# Patient Record
Sex: FEMALE | Race: OTHER | NOT HISPANIC OR LATINO
[De-identification: names, ages, dates, MRNs, and addresses within clinical notes are randomized per-mention and may not be internally consistent; named-entity substitution may affect disease eponyms.]

---

## 2024-01-30 ENCOUNTER — APPOINTMENT (OUTPATIENT)
Dept: SURGERY | Facility: CLINIC | Age: 54
End: 2024-01-30
Payer: COMMERCIAL

## 2024-01-30 PROCEDURE — 99203 OFFICE O/P NEW LOW 30 MIN: CPT

## 2024-02-22 PROBLEM — Z00.00 ENCOUNTER FOR PREVENTIVE HEALTH EXAMINATION: Status: ACTIVE | Noted: 2024-02-22

## 2024-03-15 ENCOUNTER — OUTPATIENT (OUTPATIENT)
Dept: OUTPATIENT SERVICES | Facility: HOSPITAL | Age: 54
LOS: 1 days | End: 2024-03-15
Payer: COMMERCIAL

## 2024-03-15 VITALS
RESPIRATION RATE: 18 BRPM | OXYGEN SATURATION: 98 % | SYSTOLIC BLOOD PRESSURE: 145 MMHG | HEIGHT: 60 IN | TEMPERATURE: 99 F | HEART RATE: 83 BPM | WEIGHT: 153 LBS | DIASTOLIC BLOOD PRESSURE: 89 MMHG

## 2024-03-15 DIAGNOSIS — Z01.818 ENCOUNTER FOR OTHER PREPROCEDURAL EXAMINATION: ICD-10-CM

## 2024-03-15 DIAGNOSIS — K81.9 CHOLECYSTITIS, UNSPECIFIED: ICD-10-CM

## 2024-03-15 DIAGNOSIS — E03.9 HYPOTHYROIDISM, UNSPECIFIED: ICD-10-CM

## 2024-03-15 DIAGNOSIS — I10 ESSENTIAL (PRIMARY) HYPERTENSION: ICD-10-CM

## 2024-03-15 DIAGNOSIS — E78.5 HYPERLIPIDEMIA, UNSPECIFIED: ICD-10-CM

## 2024-03-15 DIAGNOSIS — Z98.890 OTHER SPECIFIED POSTPROCEDURAL STATES: Chronic | ICD-10-CM

## 2024-03-15 LAB — BLD GP AB SCN SERPL QL: SIGNIFICANT CHANGE UP

## 2024-03-15 NOTE — H&P PST ADULT - GASTROINTESTINAL
scheduled for Laparoscopic Cholecystomy/soft/nondistended/normal active bowel sounds/tender details…

## 2024-03-15 NOTE — H&P PST ADULT - HISTORY OF PRESENT ILLNESS
54 y.o. Female with PMHx  HDL, Hypothyroidism, HTN, now with c/o right Upper quadrant pain, on work up found to have Cholecystitis   now scheduled for Laparoscopic Cholecystomy with intra- operative Cholangiogram possible open with Dr. Reyes on 3/22/2024.

## 2024-03-15 NOTE — H&P PST ADULT - RESPIRATORY
clear to auscultation bilaterally/no wheezes/no rales/no respiratory distress/airway patent/breath sounds equal

## 2024-03-15 NOTE — H&P PST ADULT - NSICDXPROCEDURE_GEN_ALL_CORE_FT
PROCEDURES:  Cholecystectomy, laparoscopic, with possible cholangiogram 15-Mar-2024 12:06:34  Ashley Jean

## 2024-03-15 NOTE — H&P PST ADULT - NSICDXPASTMEDICALHX_GEN_ALL_CORE_FT
PAST MEDICAL HISTORY:  H/O cholecystitis     High cholesterol     HTN (hypertension)     Hypothyroidism

## 2024-03-15 NOTE — H&P PST ADULT - PROBLEM SELECTOR PLAN 1
Pt schedule for Laparoscopic Cholecystomy with intra- operative Cholangiogram possible open with Dr. Reyes on 3/22/2024.    Labs drawn by PCP - will f/u result  Pt was seen by PCP for Medical clearance 3/15/24- will f/u report    Pt was  instructed to stop aspirin/ecotrin and all over the counter medication including vitamins and herbal supplements one week prior to surgery   Instructions given on the use of 4% chlorhexidine wash and Pt verbalized understanding of same   Pt Instructed to have nothing by mouth starting midnight day before surgery  Patient is to expect a phone call day before surgery between the hours of 430- 630pm giving arrival time for surgery   Written and verbal preoperative instructions given to patient with understanding verbalized via interpeter Giovanny # [id number]    Patient today with STOP bang score 3  Low  risk for EDWARD

## 2024-03-15 NOTE — H&P PST ADULT - ASSESSMENT
54 y.o. Female with Cholecystitis now scheduled for Laparoscopic Cholecystomy with intra- operative Cholangiogram possible open with Dr. Reyes on 3/22/2024.      Stop Bang 3

## 2024-03-18 PROCEDURE — T1013: CPT

## 2024-03-18 PROCEDURE — G0463: CPT

## 2024-03-21 ENCOUNTER — TRANSCRIPTION ENCOUNTER (OUTPATIENT)
Age: 54
End: 2024-03-21

## 2024-03-22 ENCOUNTER — TRANSCRIPTION ENCOUNTER (OUTPATIENT)
Age: 54
End: 2024-03-22

## 2024-03-22 ENCOUNTER — APPOINTMENT (OUTPATIENT)
Dept: SURGERY | Facility: HOSPITAL | Age: 54
End: 2024-03-22

## 2024-03-22 ENCOUNTER — RESULT REVIEW (OUTPATIENT)
Age: 54
End: 2024-03-22

## 2024-03-22 ENCOUNTER — OUTPATIENT (OUTPATIENT)
Dept: OUTPATIENT SERVICES | Facility: HOSPITAL | Age: 54
LOS: 1 days | End: 2024-03-22
Payer: COMMERCIAL

## 2024-03-22 VITALS
RESPIRATION RATE: 16 BRPM | OXYGEN SATURATION: 99 % | SYSTOLIC BLOOD PRESSURE: 118 MMHG | HEART RATE: 83 BPM | TEMPERATURE: 98 F | WEIGHT: 153 LBS | HEIGHT: 60 IN | DIASTOLIC BLOOD PRESSURE: 71 MMHG

## 2024-03-22 VITALS
DIASTOLIC BLOOD PRESSURE: 61 MMHG | SYSTOLIC BLOOD PRESSURE: 100 MMHG | RESPIRATION RATE: 14 BRPM | HEART RATE: 55 BPM | TEMPERATURE: 98 F | OXYGEN SATURATION: 98 %

## 2024-03-22 DIAGNOSIS — Z98.890 OTHER SPECIFIED POSTPROCEDURAL STATES: Chronic | ICD-10-CM

## 2024-03-22 DIAGNOSIS — K81.9 CHOLECYSTITIS, UNSPECIFIED: ICD-10-CM

## 2024-03-22 DIAGNOSIS — Z01.818 ENCOUNTER FOR OTHER PREPROCEDURAL EXAMINATION: ICD-10-CM

## 2024-03-22 LAB
BLD GP AB SCN SERPL QL: SIGNIFICANT CHANGE UP
HCG UR QL: NEGATIVE — SIGNIFICANT CHANGE UP

## 2024-03-22 PROCEDURE — 88304 TISSUE EXAM BY PATHOLOGIST: CPT

## 2024-03-22 PROCEDURE — 88304 TISSUE EXAM BY PATHOLOGIST: CPT | Mod: 26

## 2024-03-22 PROCEDURE — 86900 BLOOD TYPING SEROLOGIC ABO: CPT

## 2024-03-22 PROCEDURE — 47563 LAPARO CHOLECYSTECTOMY/GRAPH: CPT

## 2024-03-22 PROCEDURE — 47563 LAPARO CHOLECYSTECTOMY/GRAPH: CPT | Mod: AS

## 2024-03-22 PROCEDURE — 86901 BLOOD TYPING SEROLOGIC RH(D): CPT

## 2024-03-22 PROCEDURE — 81025 URINE PREGNANCY TEST: CPT

## 2024-03-22 PROCEDURE — 36415 COLL VENOUS BLD VENIPUNCTURE: CPT

## 2024-03-22 PROCEDURE — 86850 RBC ANTIBODY SCREEN: CPT

## 2024-03-22 PROCEDURE — 76000 FLUOROSCOPY <1 HR PHYS/QHP: CPT

## 2024-03-22 DEVICE — CLIP APPLIER COVIDIEN ENDOCLIP II 10MM MED/LG: Type: IMPLANTABLE DEVICE | Status: FUNCTIONAL

## 2024-03-22 DEVICE — IMPLANTABLE DEVICE: Type: IMPLANTABLE DEVICE | Status: FUNCTIONAL

## 2024-03-22 RX ORDER — LOSARTAN/HYDROCHLOROTHIAZIDE 100MG-25MG
1 TABLET ORAL
Refills: 0 | DISCHARGE

## 2024-03-22 RX ORDER — ATORVASTATIN CALCIUM 80 MG/1
1 TABLET, FILM COATED ORAL
Refills: 0 | DISCHARGE

## 2024-03-22 RX ORDER — LEVOTHYROXINE SODIUM 125 MCG
1 TABLET ORAL
Refills: 0 | DISCHARGE

## 2024-03-22 RX ORDER — FENTANYL CITRATE 50 UG/ML
25 INJECTION INTRAVENOUS
Refills: 0 | Status: DISCONTINUED | OUTPATIENT
Start: 2024-03-22 | End: 2024-03-22

## 2024-03-22 RX ORDER — FENTANYL CITRATE 50 UG/ML
50 INJECTION INTRAVENOUS ONCE
Refills: 0 | Status: DISCONTINUED | OUTPATIENT
Start: 2024-03-22 | End: 2024-03-22

## 2024-03-22 RX ORDER — AMLODIPINE BESYLATE 2.5 MG/1
1 TABLET ORAL
Refills: 0 | DISCHARGE

## 2024-03-22 RX ORDER — OXYCODONE HYDROCHLORIDE 5 MG/1
5 TABLET ORAL ONCE
Refills: 0 | Status: DISCONTINUED | OUTPATIENT
Start: 2024-03-22 | End: 2024-03-22

## 2024-03-22 RX ADMIN — FENTANYL CITRATE 25 MICROGRAM(S): 50 INJECTION INTRAVENOUS at 12:00

## 2024-03-22 RX ADMIN — FENTANYL CITRATE 50 MICROGRAM(S): 50 INJECTION INTRAVENOUS at 11:05

## 2024-03-22 RX ADMIN — FENTANYL CITRATE 50 MICROGRAM(S): 50 INJECTION INTRAVENOUS at 11:35

## 2024-03-22 RX ADMIN — FENTANYL CITRATE 25 MICROGRAM(S): 50 INJECTION INTRAVENOUS at 11:35

## 2024-03-22 NOTE — BRIEF OPERATIVE NOTE - NSICDXBRIEFPROCEDURE_GEN_ALL_CORE_FT
PROCEDURES:  Cholecystectomy, laparoscopic, with intraoperative cholangiogram 22-Mar-2024 10:25:33  Dez Steinberg

## 2024-03-22 NOTE — ASU DISCHARGE PLAN (ADULT/PEDIATRIC) - CARE PROVIDER_API CALL
Erlin Reyes  Surgery  [address], [address]  [place] [address]  Phone: [phone]  Fax: [phone]  Follow Up Time:    Erlin Reyes  Surgery  [address]  [place] [address]  Phone: [phone]  Fax: [phone]  Follow Up Time: 2 weeks

## 2024-03-23 RX ORDER — OXYCODONE HYDROCHLORIDE 5 MG/1
1 TABLET ORAL
Qty: 5 | Refills: 0
Start: 2024-03-23

## 2024-03-25 PROBLEM — E78.00 PURE HYPERCHOLESTEROLEMIA, UNSPECIFIED: Chronic | Status: ACTIVE | Noted: 2024-03-15

## 2024-03-25 PROBLEM — E03.9 HYPOTHYROIDISM, UNSPECIFIED: Chronic | Status: ACTIVE | Noted: 2024-03-15

## 2024-03-25 PROBLEM — I10 ESSENTIAL (PRIMARY) HYPERTENSION: Chronic | Status: ACTIVE | Noted: 2024-03-15

## 2024-03-25 PROBLEM — Z87.19 PERSONAL HISTORY OF OTHER DISEASES OF THE DIGESTIVE SYSTEM: Chronic | Status: ACTIVE | Noted: 2024-03-15

## 2024-03-29 LAB — SURGICAL PATHOLOGY STUDY: SIGNIFICANT CHANGE UP

## 2024-04-02 NOTE — DATA REVIEWED
[FreeTextEntry1] :   	 [facility] Accession Number : [id number] Patient:     STARLA FONTAINE   Accession:                             [id number]  Collected Date/Time:                   3/22/2024 11:46 EDT Received Date/Time:                    3/25/2024 08:00 EDT  Surgical Pathology Report - Auth (Verified)  Specimen(s) Submitted 1  Gallbladder  Final Diagnosis Gallbladder; laparoscopic cholecystectomy: -   Chronic cholecystitis and cholelithiasis  Verified by: Sara Marques M.D. (Electronic Signature) Reported on: 03/29/24 10:38 EDT, [address] Phone: [phone]   Fax: [phone]

## 2024-04-04 ENCOUNTER — APPOINTMENT (OUTPATIENT)
Dept: SURGERY | Facility: CLINIC | Age: 54
End: 2024-04-04
Payer: COMMERCIAL

## 2024-04-08 ENCOUNTER — APPOINTMENT (OUTPATIENT)
Dept: SURGERY | Facility: CLINIC | Age: 54
End: 2024-04-08
Payer: COMMERCIAL

## 2024-04-08 VITALS
DIASTOLIC BLOOD PRESSURE: 81 MMHG | HEIGHT: 57.48 IN | WEIGHT: 161 LBS | HEART RATE: 80 BPM | SYSTOLIC BLOOD PRESSURE: 125 MMHG | BODY MASS INDEX: 34.26 KG/M2 | OXYGEN SATURATION: 99 %

## 2024-04-08 DIAGNOSIS — I10 ESSENTIAL (PRIMARY) HYPERTENSION: ICD-10-CM

## 2024-04-08 DIAGNOSIS — Z78.9 OTHER SPECIFIED HEALTH STATUS: ICD-10-CM

## 2024-04-08 DIAGNOSIS — Z83.3 FAMILY HISTORY OF DIABETES MELLITUS: ICD-10-CM

## 2024-04-08 DIAGNOSIS — K81.9 CHOLECYSTITIS, UNSPECIFIED: ICD-10-CM

## 2024-04-08 PROCEDURE — 99024 POSTOP FOLLOW-UP VISIT: CPT

## 2024-04-08 NOTE — PLAN
[FreeTextEntry1] : Ms. FONTAINE will follow up  if needed. Warning signs, follow up, and restrictions were discussed with the patient.

## 2024-04-08 NOTE — HISTORY OF PRESENT ILLNESS
[de-identified] : Ms. FONTAINE  is s/p   Laparoscopic cholecystectomy and OR cholangiogram on 03/22/2024. Today Ms. FONTAINE offers no complaints. patient reports no fever, chills,  or  pain. Her  surgical wounds are  healing well. No signs of inflammation, infection or exudate. Patient reports good bowel movements and appetite.

## 2024-04-08 NOTE — DATA REVIEWED
[FreeTextEntry1] : 	 [facility] Accession Number : [id number] Patient:     STARLA FONTAINE   Accession:                             [id number]  Collected Date/Time:                   3/22/2024 11:46 EDT Received Date/Time:                    3/25/2024 08:00 EDT  Surgical Pathology Report - Auth (Verified)  Specimen(s) Submitted 1  Gallbladder  Final Diagnosis Gallbladder; laparoscopic cholecystectomy: -   Chronic cholecystitis and cholelithiasis  Verified by: Sara Marques M.D. (Electronic Signature) Reported on: 03/29/24 10:38 EDT, [address] Phone: [phone]   Fax: [phone] _________________________________________________________________  Clinical Information Not provided

## 2024-04-08 NOTE — ASSESSMENT
[FreeTextEntry1] : Ms. FONTAINE is doing well, with excellent post-operative recovery. All surgical incisions are healing well and as expected. There is no evidence of infection or complication, and she is progressing as expected. Post-operative wound care, activity, restrictions and precautions reinforced. Patient instructed to refrain from any heavy lifting greater than 10-15 pounds for at least 4 weeks post-operatively. pathology results were discussed in details.  Patient's questions and concerns addressed to patient's satisfaction.

## 2024-04-08 NOTE — PHYSICAL EXAM
[Alert] : alert [Oriented to Person] : oriented to person [Oriented to Time] : oriented to time [Calm] : calm [de-identified] : She  is alert, well-groomed, and in NAD   [de-identified] : Surgical wounds are  healing well.   no signs of  inflammation or infection.

## (undated) DEVICE — FOR-ESU VALLEYLAB T7E14848DX: Type: DURABLE MEDICAL EQUIPMENT

## (undated) DEVICE — ENDOCATCH 10MM SPECIMEN POUCH

## (undated) DEVICE — BASIN SET SINGLE

## (undated) DEVICE — BLADE SURGICAL #15 CARBON

## (undated) DEVICE — DRAIN RESERVOIR FOR JACKSON PRATT 100CC CARDINAL

## (undated) DEVICE — VENODYNE/SCD SLEEVE CALF MEDIUM

## (undated) DEVICE — GLV 7 PROTEXIS (WHITE)

## (undated) DEVICE — DRSG MEDIPORE DRESS IT 5-7/8 X 11"

## (undated) DEVICE — SUT MAXON 1 60" T-60

## (undated) DEVICE — TUBING TRUWAVE PRESSURE MALE/FEMALE 72"

## (undated) DEVICE — DRSG MASTISOL

## (undated) DEVICE — SOL INJ NS 0.9% 1000ML

## (undated) DEVICE — SYR LUER LOK 10CC

## (undated) DEVICE — DRSG STERISTRIPS 0.5 X 4"

## (undated) DEVICE — TROCAR COVIDIEN VERSAONE BLADED SMOOTH 11MM STANDARD

## (undated) DEVICE — BLADE SURGICAL #10 CARBON

## (undated) DEVICE — SUT POLYSORB 0 30" GU-46

## (undated) DEVICE — DRAIN JACKSON PRATT 10MM FLAT 3/4 NO TROCAR

## (undated) DEVICE — SUT POLYSORB 4-0 27" P-12 UNDYED

## (undated) DEVICE — DRAPE C ARM UNIVERSAL

## (undated) DEVICE — TROCAR COVIDIEN VERSAONE BLADED SMOOTH 5MM STANDARD

## (undated) DEVICE — SYR ASEPTO

## (undated) DEVICE — WARMING BLANKET UPPER ADULT

## (undated) DEVICE — NDL HYPO REGULAR BEVEL 25G X 1.5" (BLUE)

## (undated) DEVICE — INSUFFLATION NDL COVIDIEN SURGINEEDLE VERESS 120MM

## (undated) DEVICE — DRSG 2X2

## (undated) DEVICE — PACK GENERAL LAPAROSCOPY

## (undated) DEVICE — ELCTR BOVIE BLADE 3/4" EXTENDED LENGTH 6"

## (undated) DEVICE — STAPLER SKIN VISI-STAT 35 WIDE

## (undated) DEVICE — TUBING STRYKEFLOW II SUCTION / IRRIGATOR

## (undated) DEVICE — GLV 7.5 PROTEXIS (WHITE)

## (undated) DEVICE — ELCTR GROUNDING PAD ADULT COVIDIEN

## (undated) DEVICE — DRSG TEGADERM 2.5X3"

## (undated) DEVICE — D HELP - CLEARVIEW CLEARIFY SYSTEM

## (undated) DEVICE — PRECISION CUT SCISSOR MICROLINE MINI ENDOCUT DISP

## (undated) DEVICE — DRAPE LIGHT HANDLE COVER (BLUE)

## (undated) DEVICE — SOL IRR POUR NS 0.9% 1500ML

## (undated) DEVICE — TUBING STRYKER PNEUMOSURE HEATED RTP

## (undated) DEVICE — ELCTR FOOT CONTROL L WIRE LAPAROSCOPIC

## (undated) DEVICE — SUCTION YANKAUER NO CONTROL VENT

## (undated) DEVICE — DRSG BANDAID 0.75X3"